# Patient Record
Sex: FEMALE | Race: WHITE | NOT HISPANIC OR LATINO | Employment: OTHER | URBAN - METROPOLITAN AREA
[De-identification: names, ages, dates, MRNs, and addresses within clinical notes are randomized per-mention and may not be internally consistent; named-entity substitution may affect disease eponyms.]

---

## 2023-05-28 PROBLEM — N60.01 BREAST CYST, RIGHT: Status: ACTIVE | Noted: 2023-05-28

## 2023-05-28 PROBLEM — N63.11 MASS OF UPPER OUTER QUADRANT OF RIGHT BREAST: Status: ACTIVE | Noted: 2023-05-28

## 2023-05-29 ENCOUNTER — OFFICE VISIT (OUTPATIENT)
Dept: SURGERY | Facility: CLINIC | Age: 53
End: 2023-05-29
Payer: COMMERCIAL

## 2023-05-29 VITALS — BODY MASS INDEX: 23.9 KG/M2 | HEIGHT: 64 IN | WEIGHT: 140 LBS

## 2023-05-29 DIAGNOSIS — N60.01 BREAST CYST, RIGHT: ICD-10-CM

## 2023-05-29 DIAGNOSIS — N60.09 CYST OF BREAST, UNSPECIFIED LATERALITY: ICD-10-CM

## 2023-05-29 DIAGNOSIS — N63.11 MASS OF UPPER OUTER QUADRANT OF RIGHT BREAST: ICD-10-CM

## 2023-05-29 PROCEDURE — 99203 PR OFFICE/OUTPT VISIT, NEW, LEVL III, 30-44 MIN: ICD-10-PCS | Mod: S$GLB,,, | Performed by: NURSE PRACTITIONER

## 2023-05-29 PROCEDURE — 1160F PR REVIEW ALL MEDS BY PRESCRIBER/CLIN PHARMACIST DOCUMENTED: ICD-10-PCS | Mod: CPTII,S$GLB,, | Performed by: NURSE PRACTITIONER

## 2023-05-29 PROCEDURE — 1159F PR MEDICATION LIST DOCUMENTED IN MEDICAL RECORD: ICD-10-PCS | Mod: CPTII,S$GLB,, | Performed by: NURSE PRACTITIONER

## 2023-05-29 PROCEDURE — 99203 OFFICE O/P NEW LOW 30 MIN: CPT | Mod: S$GLB,,, | Performed by: NURSE PRACTITIONER

## 2023-05-29 PROCEDURE — 1160F RVW MEDS BY RX/DR IN RCRD: CPT | Mod: CPTII,S$GLB,, | Performed by: NURSE PRACTITIONER

## 2023-05-29 PROCEDURE — 3008F PR BODY MASS INDEX (BMI) DOCUMENTED: ICD-10-PCS | Mod: CPTII,S$GLB,, | Performed by: NURSE PRACTITIONER

## 2023-05-29 PROCEDURE — 1159F MED LIST DOCD IN RCRD: CPT | Mod: CPTII,S$GLB,, | Performed by: NURSE PRACTITIONER

## 2023-05-29 PROCEDURE — 3008F BODY MASS INDEX DOCD: CPT | Mod: CPTII,S$GLB,, | Performed by: NURSE PRACTITIONER

## 2023-05-29 NOTE — ASSESSMENT & PLAN NOTE
Her right breast mass was consistent with a benign appearing complicated cysts in January 2023 and close follow up was recommended. External notes, reports and imaging have been reviewed.  I have interpreted her films myself prior to and during the exam and I agree with the outside interpretation.  A thorough discussion of all findings were discussed with the patient as was our current Plan of Care.  Follow up imaging will be performed in July as recommended. She understands the importance of monthly self-breast exams and knows to notify me of any and all changes as they occur.

## 2023-05-29 NOTE — ASSESSMENT & PLAN NOTE
We discussed simple, complicated, and complex cystic changes at length.  We talked about our FCM Protocol and how cysts can change over time.  She was given our Handout that discussed the various cyst types and treatment options.    Her cysts appear benign (yet complicated) and can be followed with repeat imaging in July 2023 as recommended..  She knows that these findings will not increase her future risk of developing a breast cancer.  Despite these recommendations, should she notice any change - she should report it to us immediately.

## 2023-05-29 NOTE — PROGRESS NOTES
[facility]  MD Blair Fischer, NP-C    Chief Complaint:   Hannah Anthony is a 53 y.o. female presenting today for right breast mass.  Her imaging has shown a benign appearing cyst in January and close follow up has been recommended.    History of Present Illness:   Mrs. Hannah Anthony presents on 5/29/2023 due to right breast mass  that on imaging was found to be a benign cyst and the recommendation was made to follow the area conservatively. MD:::Lauren Cabrera MD    Past Medical History:   Diagnosis Date    Anxiety     Asthma     Breast cyst, right     1/2023- dx mmg-prob benign,    Breast cyst, right 5/28/2023    History of abnormal Pap     10/13 nl /+HR HPV(-16/18);3/15 LGSIL/+HR HPV(-16/18);4/15 colpo LAURA II w/-ECC; 5/15 LEEP LAURA II w/-endocvx;1/16 nl;7/16 nl;8/17 nl; 9/18 nl; 9/19 nl/+HPV (-16/18); 10/20 scant/-HPV; 12/20 nl. 4/22 nL/+HR HPV (-16/18); 5/22 CIN1, ECC LAURA 1; 5/23 nl/-hpv, LAURA 1    Mass of upper outer quadrant of right breast 5/28/2023    Tortuous colon       Past Surgical History:   Procedure Laterality Date    LOOP ELECTROSURGICAL EXCISION PROCEDURE (LEEP)          Current Outpatient Medications:     methylphenidate HCl (METADATE CD) 30 MG CR capsule, Take 30 mg by mouth once daily., Disp: , Rfl:     sertraline (ZOLOFT) 100 MG tablet, Take 200 mg by mouth once daily., Disp: , Rfl:     valACYclovir (VALTREX) 1000 MG tablet, TAKE ONE TABLET BY MOUTH DAILY AS NEEDED FOR herpes outbreak FOR UP TO FIVE DAYS, Disp: , Rfl:    Review of patient's allergies indicates:  No Known Allergies   Social History     Tobacco Use    Smoking status: Never    Smokeless tobacco: Never   Substance Use Topics    Alcohol use: Yes      Family History   Problem Relation Age of Onset    Asthma Father     Stomach cancer Paternal Uncle     Prostate cancer Paternal Uncle     Breast cancer Paternal Aunt         Review of Systems   Integumentary:  Positive  for breast mass. Negative for color change, rash, mole/lesion, breast discharge and breast tenderness.   Breast: Positive for mass.Negative for tenderness     Physical Exam   Constitutional: She appears well-developed. She is cooperative.   HENT:   Head: Normocephalic.   Cardiovascular:  Normal rate and regular rhythm.            Pulmonary/Chest: She exhibits no tenderness and no bony tenderness. Right breast exhibits mass (stable 2 cm mass noted in the 11 - 12 o'clock position consistent with a cyst on prior imaging). Right breast exhibits no nipple discharge, no skin change and no tenderness. Left breast exhibits no mass, no nipple discharge, no skin change and no tenderness.       Abdominal: Soft. Normal appearance.   Musculoskeletal: Lymphadenopathy:      Upper Body:      Right upper body: No supraclavicular or axillary adenopathy.      Left upper body: No supraclavicular or axillary adenopathy.     Neurological: She is alert.   Skin: No rash noted.     Mammogram and Right Ultrasound 1/13/2023: This procedure was performed using tomosynthesis. Computer-aided detection was utilized in the interpretation of this examination.There is a 1.8 x 1.5 x 1.5 cm complex cyst in the 11 o'clock position of the right breast 3 cm from the nipple corresponding to patient's palpable abnormality.  Six-month follow-up ultrasound recommended.     1. Mass of upper outer quadrant of right breast  Assessment & Plan:  Her right breast mass was consistent with a benign appearing complicated cysts in January 2023 and close follow up was recommended. External notes, reports and imaging have been reviewed.  I have interpreted her films myself prior to and during the exam and I agree with the outside interpretation.  A thorough discussion of all findings were discussed with the patient as was our current Plan of Care.  Follow up imaging will be performed in July as recommended. She understands the importance of monthly self-breast exams and  knows to notify me of any and all changes as they occur.       2. Breast cyst, right  Assessment & Plan:  We discussed simple, complicated, and complex cystic changes at length.  We talked about our FCM Protocol and how cysts can change over time.  She was given our Handout that discussed the various cyst types and treatment options.    Her cysts appear benign (yet complicated) and can be followed with repeat imaging in July 2023 as recommended..  She knows that these findings will not increase her future risk of developing a breast cancer.  Despite these recommendations, should she notice any change - she should report it to us immediately.             Medical Decision Making:  It is my impression that this patient suffers all conditions contained in this medical document.  Each of these conditions did affect our plan of care and my medical decision making today.  It is my opinion that the medical decision making concerning this patient was of moderate difficulty based on the aforementioned conditions.  Any further recommendations will be communicated to the patient by me.  I have reviewed and verified her allergies, list of medications, medical and surgical histories, social history, and a pertinent review of symptoms.      Follow up:  July 2023, PRN    For:  US gopal barreto (D) right at [facility]      No orders of the defined types were placed in this encounter.